# Patient Record
Sex: MALE | Race: WHITE | NOT HISPANIC OR LATINO | Employment: UNEMPLOYED | URBAN - METROPOLITAN AREA
[De-identification: names, ages, dates, MRNs, and addresses within clinical notes are randomized per-mention and may not be internally consistent; named-entity substitution may affect disease eponyms.]

---
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
## 2022-06-02 ENCOUNTER — PATIENT ROUNDING (BHMG ONLY) (OUTPATIENT)
Dept: FAMILY MEDICINE CLINIC | Facility: CLINIC | Age: 22
End: 2022-06-02

## 2022-06-02 ENCOUNTER — OFFICE VISIT (OUTPATIENT)
Dept: FAMILY MEDICINE CLINIC | Facility: CLINIC | Age: 22
End: 2022-06-02

## 2022-06-02 VITALS
HEART RATE: 88 BPM | OXYGEN SATURATION: 97 % | DIASTOLIC BLOOD PRESSURE: 78 MMHG | TEMPERATURE: 96 F | HEIGHT: 70 IN | BODY MASS INDEX: 29.62 KG/M2 | WEIGHT: 206.9 LBS | SYSTOLIC BLOOD PRESSURE: 96 MMHG

## 2022-06-02 DIAGNOSIS — Z00.00 ROUTINE PHYSICAL EXAMINATION: Primary | ICD-10-CM

## 2022-06-02 LAB
BILIRUB BLD-MCNC: NEGATIVE MG/DL
CLARITY, POC: CLEAR
COLOR UR: YELLOW
EXPIRATION DATE: ABNORMAL
GLUCOSE UR STRIP-MCNC: NEGATIVE MG/DL
KETONES UR QL: NEGATIVE
LEUKOCYTE EST, POC: NEGATIVE
Lab: ABNORMAL
NITRITE UR-MCNC: NEGATIVE MG/ML
PH UR: 5.5 [PH] (ref 5–8)
PROT UR STRIP-MCNC: NEGATIVE MG/DL
RBC # UR STRIP: NEGATIVE /UL
SP GR UR: 1.03 (ref 1–1.03)
UROBILINOGEN UR QL: NORMAL

## 2022-06-02 PROCEDURE — 99385 PREV VISIT NEW AGE 18-39: CPT | Performed by: FAMILY MEDICINE

## 2022-06-02 PROCEDURE — 81003 URINALYSIS AUTO W/O SCOPE: CPT | Performed by: FAMILY MEDICINE

## 2022-06-02 NOTE — PROGRESS NOTES
"Chief Complaint  Establish Care (Np est care, cpe) and Annual Exam    Subjective        Jose Luis Hatch presents to [facility]  History of Present Illness to establish care and annual exam.  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  Today transferring care from pediatrician's office.          Objective   Vital Signs:  BP 96/78   Pulse 88   Temp 96 °F (35.6 °C)   Ht 177.8 cm (70\")   Wt 93.8 kg (206 lb 14.4 oz)   SpO2 97%   BMI 29.69 kg/m²     BMI is >= 25 and < 30. (Overweight) The following options were offered after discussion: exercise counseling/recommendations and nutrition counseling/recommendations      Physical Exam  Constitutional:       Appearance: Normal appearance. He is well-developed.   HENT:      Head: Normocephalic and atraumatic.      Right Ear: Tympanic membrane, ear canal and external ear normal.      Left Ear: Tympanic membrane, ear canal and external ear normal.      Nose: Nose normal.      Mouth/Throat:      Mouth: Mucous membranes are moist.   Eyes:      General: No scleral icterus.     Extraocular Movements: Extraocular movements intact.      Conjunctiva/sclera: Conjunctivae normal.      Pupils: Pupils are equal, round, and reactive to light.   Neck:      Thyroid: No thyromegaly.   Cardiovascular:      Rate and Rhythm: Normal rate and regular rhythm.      Pulses: Normal pulses.      Heart sounds: Normal heart sounds.   Pulmonary:      Effort: Pulmonary effort is normal. No respiratory distress.      Breath sounds: Normal breath sounds. No wheezing or rales.   Abdominal:      General: Bowel sounds are normal. There is no distension.      Palpations: Abdomen is soft. There is no mass.      Tenderness: There is no abdominal tenderness.      Hernia: No hernia is present.   Musculoskeletal:         General: No swelling or tenderness. Normal range of motion.      Cervical back: Normal range of motion and neck supple.   Lymphadenopathy:      " Cervical: No cervical adenopathy.   Skin:     General: Skin is warm.   Neurological:      General: No focal deficit present.      Mental Status: He is alert and oriented to person, place, and time.      Cranial Nerves: No cranial nerve deficit.      Sensory: No sensory deficit.      Deep Tendon Reflexes: Reflexes normal.   Psychiatric:         Mood and Affect: Mood normal.         Behavior: Behavior normal.         Thought Content: Thought content normal.         Judgment: Judgment normal.        Result Review :                Assessment and Plan   Diagnoses and all orders for this visit:    1. Routine physical examination (Primary)  -     POC Urinalysis Dipstick, Automated    Jose Luis Hatch is a 21-year-old male patient came here for establish care and for routine physical.  As per patient he is up-to-date with all the vaccination  We will get the records from his pediatrician's office.  Urinary done in the office negative    secondhand smoking, substance abuse and regular physical exercise discussed with the patient.  Safe driving, safe sex and mental wellbeing discussed with patient.   oral heatlth and minimizing uv radiation alos recommended to patient         Follow Up   No follow-ups on file.  Patient was given instructions and counseling regarding his condition or for health maintenance advice. Please see specific information pulled into the AVS if appropriate.

## 2025-05-09 ENCOUNTER — TELEPHONE (OUTPATIENT)
Dept: FAMILY MEDICINE CLINIC | Facility: CLINIC | Age: 25
End: 2025-05-09

## 2025-05-09 ENCOUNTER — OFFICE VISIT (OUTPATIENT)
Dept: FAMILY MEDICINE CLINIC | Facility: CLINIC | Age: 25
End: 2025-05-09
Payer: COMMERCIAL

## 2025-05-09 VITALS
BODY MASS INDEX: 29.49 KG/M2 | SYSTOLIC BLOOD PRESSURE: 130 MMHG | OXYGEN SATURATION: 97 % | WEIGHT: 206 LBS | HEIGHT: 70 IN | HEART RATE: 89 BPM | RESPIRATION RATE: 18 BRPM | DIASTOLIC BLOOD PRESSURE: 80 MMHG

## 2025-05-09 DIAGNOSIS — G89.29 CHRONIC RIGHT SHOULDER PAIN: ICD-10-CM

## 2025-05-09 DIAGNOSIS — F33.1 MODERATE EPISODE OF RECURRENT MAJOR DEPRESSIVE DISORDER: Primary | ICD-10-CM

## 2025-05-09 DIAGNOSIS — M25.511 CHRONIC RIGHT SHOULDER PAIN: ICD-10-CM

## 2025-05-09 PROCEDURE — 99214 OFFICE O/P EST MOD 30 MIN: CPT | Performed by: FAMILY MEDICINE

## 2025-05-09 NOTE — TELEPHONE ENCOUNTER
Caller: Andree Hatch    Relationship: Mother    Best call back number: [phone]         Who are you requesting to speak with (clinical staff, provider,  specific staff member): PCP OR CLINICAL        What was the call regarding: PATIENT'S MOM CALLED AND SAID DR. HARTMAN WAS SUPPOSED TO BE SENDING IN AN RX FOR DEPRESSION FOR PATIENT AND A REFERRAL FOR PHYSICAL THERAPY FOR SHOULDER.  I DIDN'T SEE EITHER ONE LISTED.  PLEASE CALL HER AT NUMBER ABOVE TO ADVISE.

## 2025-05-10 RX ORDER — BUPROPION HYDROCHLORIDE 150 MG/1
150 TABLET ORAL DAILY
Qty: 30 TABLET | Refills: 12 | Status: SHIPPED | OUTPATIENT
Start: 2025-05-10

## 2025-05-12 NOTE — ASSESSMENT & PLAN NOTE
- Exhibits symptoms suggestive of depression, including feelings of failure, lack of motivation, and self-disappointment.  - Advised to develop a comprehensive plan to manage academic responsibilities, including the possibility of taking summer classes to expedite graduation.  - Encouraged to limit social media usage and maintain a journal of feelings. Referral for therapy made to provide additional support and cognitive behavioral strategies.  - Wellbutrin prescribed to help improve mood and motivation. Medication might take about 2 weeks to start working and up to a month to reach its full effect.

## 2025-05-12 NOTE — ASSESSMENT & PLAN NOTE
- Reports pain in right shoulder, particularly when throwing objects like a football.  - Physical examination revealed discomfort when moving the shoulder in certain ways.  - Referral to physical therapy made to address shoulder pain.

## 2025-05-12 NOTE — PROGRESS NOTES
Chief Complaint  Chief Complaint   Patient presents with    Depression     Pt here to discuss ongoing depression x 3 months    Shoulder Pain     Pt c/o R shoulder pain on/off x 2 years        Subjective    History of Present Illness        Jose Luis Hatch presents to [facility] for   History of Present Illness  The patient presents for evaluation of mood issues and right shoulder pain.    He reports a recent decline in his mood, characterized by feelings of failure and self-disappointment. He is currently unable to graduate this semester, which has led to a sense of self-loathing and confusion. He has been contemplating dropping out of school but fears future regret. He has been experiencing suicidal ideation for the past few years, with thoughts of ending his life within the next 20 to 30 years. 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 Despite initial success in his new major, he has since stopped attending classes, spending most of his time in bed, scrolling through his phone, and sleeping. He expresses dissatisfaction with his lack of financial discipline, relying on his parents for monetary support. His roommate suspects he may have ADHD due to similar behavioral patterns, and he has an upcoming appointment with an ADHD specialist next Thursday. He acknowledges a lack of motivation and expresses a desire to return to school, fearing that dropping out would render the past 4 years of his life wasted. He still has approximately 2 years of study remaining. He has considered seeking employment as a writer but is concerned about the financial instability of this career path. He finds it challenging to communicate his struggles to his parents, fearing their disappointment. He recalls issues with procrastination and incomplete homework during high school but  "managed to maintain good grades. He believes he is intelligent and capable but struggles with consistency. He attempts to establish a consistent sleep schedule and complete homework promptly but often falls off track after missing one class. He has never taken medication for depression and does not believe his current issues are related to depression. He has two half-siblings and maintains close friendships. He is interested in therapy and cognitive behavioral strategies.    He reports experiencing pain in his right shoulder when throwing a football. He describes the sensation as if the tendon is rolling over the bone. He recalls a similar incident a few years ago while playing softball, which he attributed to lack of use of his shoulder muscle.     History of Present Illness      Objective   Vital Signs:   Visit Vitals  /80   Pulse 89   Resp 18   Ht 177.8 cm (70\")   Wt 93.4 kg (206 lb)   SpO2 97%   BMI 29.56 kg/m²          BMI is >= 25 and <30. (Overweight) The following options were offered after discussion;: exercise counseling/recommendations and nutrition counseling/recommendations     Physical Exam  Vitals reviewed.   Constitutional:       Appearance: He is well-developed.   HENT:      Head: Normocephalic.      Right Ear: External ear normal.      Left Ear: External ear normal.      Nose: Nose normal.   Eyes:      Conjunctiva/sclera: Conjunctivae normal.      Pupils: Pupils are equal, round, and reactive to light.   Cardiovascular:      Rate and Rhythm: Normal rate and regular rhythm.      Heart sounds: Normal heart sounds.   Pulmonary:      Effort: Pulmonary effort is normal.      Breath sounds: Normal breath sounds.   Musculoskeletal:         General: Normal range of motion.      Right shoulder: Tenderness present.      Cervical back: Normal range of motion and neck supple.   Skin:     General: Skin is warm and dry.      Capillary Refill: Capillary refill takes less than 2 seconds.   Neurological:      " Mental Status: He is alert and oriented to person, place, and time.   Psychiatric:         Behavior: Behavior normal.         Thought Content: Thought content normal.         Judgment: Judgment normal.        Physical Exam  Respiratory: Clear to auscultation, no wheezing, rales or rhonchi  Musculoskeletal: Tenderness and abnormal movement noted in the right shoulder         Result Review :  Results                            Assessment and Plan      Diagnoses and all orders for this visit:    1. Moderate episode of recurrent major depressive disorder (Primary)  Assessment & Plan:  - Exhibits symptoms suggestive of depression, including feelings of failure, lack of motivation, and self-disappointment.  - Advised to develop a comprehensive plan to manage academic responsibilities, including the possibility of taking summer classes to expedite graduation.  - Encouraged to limit social media usage and maintain a journal of feelings. Referral for therapy made to provide additional support and cognitive behavioral strategies.  - Wellbutrin prescribed to help improve mood and motivation. Medication might take about 2 weeks to start working and up to a month to reach its full effect.    Orders:  -     buPROPion XL (WELLBUTRIN XL) 150 MG 24 hr tablet; Take 1 tablet by mouth Daily.  Dispense: 30 tablet; Refill: 12    2. Chronic right shoulder pain  Assessment & Plan:  - Reports pain in right shoulder, particularly when throwing objects like a football.  - Physical examination revealed discomfort when moving the shoulder in certain ways.  - Referral to physical therapy made to address shoulder pain.         Assessment & Plan             Follow Up   No follow-ups on file.  Patient was given instructions and counseling regarding his condition or for health maintenance advice. Please see specific information pulled into the AVS if appropriate.     Patient or patient representative verbalized consent for the use of Ambient  Listening during the visit with  Bharathi Salazar Sr, MD for chart documentation. 5/12/2025  00:24 EDT

## 2025-05-13 NOTE — TELEPHONE ENCOUNTER
I called and spoke with pts mother, she states they have received the medication and have gotten in to a psych office

## 2025-06-02 DIAGNOSIS — F33.1 MODERATE EPISODE OF RECURRENT MAJOR DEPRESSIVE DISORDER: Primary | ICD-10-CM

## 2025-06-11 ENCOUNTER — OFFICE VISIT (OUTPATIENT)
Dept: FAMILY MEDICINE CLINIC | Facility: CLINIC | Age: 25
End: 2025-06-11
Payer: COMMERCIAL

## 2025-06-11 VITALS
BODY MASS INDEX: 33.07 KG/M2 | DIASTOLIC BLOOD PRESSURE: 80 MMHG | HEIGHT: 70 IN | HEART RATE: 109 BPM | WEIGHT: 231 LBS | RESPIRATION RATE: 18 BRPM | OXYGEN SATURATION: 96 % | SYSTOLIC BLOOD PRESSURE: 112 MMHG

## 2025-06-11 DIAGNOSIS — F33.1 MODERATE EPISODE OF RECURRENT MAJOR DEPRESSIVE DISORDER: ICD-10-CM

## 2025-06-11 PROCEDURE — 99213 OFFICE O/P EST LOW 20 MIN: CPT | Performed by: FAMILY MEDICINE

## 2025-06-11 RX ORDER — ATOMOXETINE 40 MG/1
CAPSULE ORAL
COMMUNITY
Start: 2025-06-05

## 2025-06-11 RX ORDER — HYDROXYZINE HYDROCHLORIDE 25 MG/1
TABLET, FILM COATED ORAL
COMMUNITY
Start: 2025-05-27

## 2025-06-11 NOTE — PROGRESS NOTES
"Chief Complaint  Chief Complaint   Patient presents with    Depression     Pt here for 1 mon f/u on meds        Subjective    History of Present Illness        Jose Luis Hatch presents to [facility] for   Depression  Visit:  Follow-up  Follow-up Visit:     Medication compliance:  %    Symptoms: depressed mood      Symptoms: no chest pain, does not have insomnia, no nausea, no panic, no suicidal ideas, no thoughts that death would be easier, does not have a plan, no hopelessness, no feelings of worthlessness, no weight gain, no weight loss and no difficulty controlling mood      Severity:  Moderate    Nighttime awakenings:     PMH Includes: depression      Improvement on treatment:  Significant    Checkup from last visit  Symptoms are: new.   Onset was 1 to 6 months.   Symptoms occur: daily.  Pertinent negative symptoms include no abdominal pain, no anorexia, no joint pain, no change in stool, no chest pain, no chills, no congestion, no cough, no diaphoresis, no fatigue, no fever, no headaches, no joint swelling, no myalgias, no nausea, no neck pain, no numbness, no rash, no sore throat, no swollen glands, no dysuria, no vertigo, no visual change, no vomiting and no weakness.   Treatment and/or Medications comments include: BuPROpion      History of Present Illness      Objective   Vital Signs:   Visit Vitals  /80   Pulse 109   Resp 18   Ht 177.8 cm (70\")   Wt 105 kg (231 lb)   SpO2 96%   BMI 33.15 kg/m²                Physical Exam  Vitals reviewed.   Constitutional:       Appearance: He is well-developed.   HENT:      Head: Normocephalic.      Right Ear: External ear normal.      Left Ear: External ear normal.      Nose: Nose normal.   Eyes:      Conjunctiva/sclera: Conjunctivae normal.   Cardiovascular:      Rate and Rhythm: Normal rate and regular rhythm.   Pulmonary:      Effort: Pulmonary effort is normal.      Breath sounds: Normal breath sounds.   Musculoskeletal:    "      General: Normal range of motion.      Cervical back: Normal range of motion and neck supple.   Skin:     General: Skin is warm and dry.      Capillary Refill: Capillary refill takes less than 2 seconds.   Neurological:      Mental Status: He is alert and oriented to person, place, and time.        Physical Exam           Result Review :  Results                            Assessment and Plan      Diagnoses and all orders for this visit:    1. Moderate episode of recurrent major depressive disorder  Assessment & Plan:  Patient's depression is a recurrent episode that is moderate without psychosis. Depression is active and improving with treatment.    Plan:   Continue current medication therapy     Followup in 3 months.          Assessment & Plan             Follow Up   No follow-ups on file.  Patient was given instructions and counseling regarding his condition or for health maintenance advice. Please see specific information pulled into the AVS if appropriate.

## 2025-06-20 NOTE — PROGRESS NOTES
A My-Chart message has been sent to the patient for PATIENT ROUNDING with [facility]   
abdominal pain

## 2025-08-14 ENCOUNTER — OFFICE VISIT (OUTPATIENT)
Dept: ORTHOPEDIC SURGERY | Facility: CLINIC | Age: 25
End: 2025-08-14
Payer: COMMERCIAL

## 2025-08-14 VITALS — HEIGHT: 70 IN | TEMPERATURE: 98.2 F | BODY MASS INDEX: 33.07 KG/M2 | WEIGHT: 231 LBS

## 2025-08-14 DIAGNOSIS — M25.511 CHRONIC RIGHT SHOULDER PAIN: ICD-10-CM

## 2025-08-14 DIAGNOSIS — M75.41 ROTATOR CUFF IMPINGEMENT SYNDROME OF RIGHT SHOULDER: Primary | ICD-10-CM

## 2025-08-14 DIAGNOSIS — G89.29 CHRONIC RIGHT SHOULDER PAIN: ICD-10-CM

## 2025-08-14 RX ORDER — MELOXICAM 15 MG/1
TABLET ORAL
Qty: 30 TABLET | Refills: 0 | Status: SHIPPED | OUTPATIENT
Start: 2025-08-14